# Patient Record
Sex: MALE | Race: WHITE | NOT HISPANIC OR LATINO | Employment: OTHER | URBAN - METROPOLITAN AREA
[De-identification: names, ages, dates, MRNs, and addresses within clinical notes are randomized per-mention and may not be internally consistent; named-entity substitution may affect disease eponyms.]

---

## 2020-11-12 ENCOUNTER — CONVERSION ENCOUNTER (OUTPATIENT)
Dept: CARDIOLOGY | Facility: CLINIC | Age: 75
End: 2020-11-12

## 2020-11-12 ENCOUNTER — OFFICE VISIT CONVERTED (OUTPATIENT)
Dept: CARDIOLOGY | Facility: CLINIC | Age: 75
End: 2020-11-12
Attending: INTERNAL MEDICINE

## 2021-05-10 NOTE — H&P
History and Physical      Patient Name: Dedrick Cho   Patient ID: [id number]   Sex: Male   YOB: 1945    Primary Care Provider: Mau Stark MD    Visit Date: November 12, 2020    Provider: Arnel Myers MD   Location: [facility]   Location Address: [address] [place] [address], [facility]   [place], [place]  [id number]   Location Phone: [phone]          Chief Complaint     Bradycardia with pacemaker.       History Of Present Illness  Consult requested by: Mau Stark MD   Dedrick Cho is a 75 year old /White male with a history of previous high-degree AV block and dual-chamber pacemaker who is following up today to establish a new cardiologist in the area. The patient symptom-wise has been doing well. He denies any chest pain, or shortness of breath. He does have occasional heart palpitations, but no other complaints.   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   FAMILY MEDICAL HISTORY: Nonsignificant for premature CAD.   PSYCHOSOCIAL HISTORY: Patient does not smoke or use alcohol. Drinks caffeine daily. .   CURRENT MEDICATIONS: Levothyroxine 175 mcg daily; clopidogrel 75 mg daily; atorvastatin 20 mg daily; Co-Q10 200 mg daily; carvedilol 6.25 mg b.i.d.; losartan 50 mg daily; fish oil; multivitamin daily.   ALLERGIES: No known drug allergies.       Review of Systems  · Constitutional  o Admits  o : fatigue, recent weight changes   o Denies  o : good general health lately  · Eyes  o Denies  o : blurred vision  · HENT  o Admits  o : hearing loss or ringing, chronic sinus problem  o Denies  o : swollen glands in neck  · Cardiovascular  o Admits  o : palpitations (fast, fluttering, or skipping beats)  o Denies  o : chest pain, swelling (feet, ankles, hands), shortness of breath while walking or lying flat  · Respiratory  o Denies  o : asthma or wheezing,  "COPD  · Gastrointestinal  o Denies  o : ulcers, nausea or vomiting  · Neurologic  o Admits  o : stroke  o Denies  o : lightheaded or dizzy, headaches  · Musculoskeletal  o Admits  o : joint pain  o Denies  o : back pain  · Endocrine  o Admits  o : thyroid disease, heat or cold intolerance, excessive thirst or urination  o Denies  o : diabetes  · Heme-Lymph  o Denies  o : bleeding or bruising tendency, anemia      Vitals  Date Time BP Position Site L\R Cuff Size HR RR TEMP (F) WT  HT  BMI kg/m2 BSA m2 O2 Sat FR L/min FiO2 HC       11/12/2020 01:42 /96 Sitting    66 - R   164lbs 0oz 5'  8\" 24.94 1.89       11/12/2020 01:42 /94 Sitting                       Physical Examination  · Constitutional  o Appearance  o : Awake, alert, in no acute distress.   · Head and Face  o HEENT  o : PERRLA.  · Eyes  o Conjunctivae  o : Normal.  · Ears, Nose, Mouth and Throat  o Oral Cavity  o :   § Oral Mucosa  § : Normal.  · Neck  o Inspection/Palpation  o : No JVD.  · Respiratory  o Respiratory  o : Chest is symmetrical. Lung fields are clear. No rhonchi or wheezes heard.  · Cardiovascular  o Heart  o :   § Auscultation of Heart  § : S1, S2 normal. Regular rate and rhythm without murmurs, gallops, or rubs.  o Peripheral Vascular System  o :   § Extremities  § : Nails normal. No clubbing or cyanosis. Femoral pulses adequate. Pedal pulses adequate. No peripheral edema.  · Gastrointestinal  o Abdominal Examination  o : Abdomen soft. No masses. No guarding or rigidity. No hepatosplenomegaly. Bowel sounds normal.  · Musculoskeletal  o General  o :   § General Musculoskeletal  § : Muscle tone and strength were normal.  · Skin and Subcutaneous Tissue  o General Inspection  o : Unremarkable.  · EKG  o EKG  o : Performed in the office today.  o Indications  o : Pacemaker.  o Results  o : AV-paced beats.  · Device Interrogation  o Device Interrogation  o : Patient had an interrogation of his dual-chamber pacemaker. Right atrial lead " paced 60% of the time and working normally. Right ventricular lead paced 100% of the time and working normally. R wave could not be tested due to pacemaker dependence. Patient had 11 nonsustained short bursts, which appear to be sinus tachycardia.           Assessment     ASSESSMENT & PLAN:    1.  High-degree AV block with dual-chamber pacemaker working properly.  Repeat interrogation in 6 months.  2.  Hypertension, mild elevation today.  Recommended keeping a home blood pressure log.  Continue with        current medications.  Counseled on low-sodium diet.  3.  Dyslipidemia, on statin.        Arnel Myers MD  JH:vm             Electronically Signed by: Jessica Moyer-, Other -Author on November 18, 2020 03:51:12 PM  Electronically Co-signed by: Arnel Myers MD -Reviewer on November 18, 2020 04:05:12 PM

## 2021-05-13 ENCOUNTER — OFFICE VISIT CONVERTED (OUTPATIENT)
Dept: CARDIOLOGY | Facility: CLINIC | Age: 76
End: 2021-05-13
Attending: INTERNAL MEDICINE

## 2021-05-14 VITALS
HEIGHT: 68 IN | BODY MASS INDEX: 24.86 KG/M2 | HEART RATE: 66 BPM | SYSTOLIC BLOOD PRESSURE: 156 MMHG | DIASTOLIC BLOOD PRESSURE: 96 MMHG | WEIGHT: 164 LBS

## 2021-06-06 NOTE — PROGRESS NOTES
"   Progress Note      Patient Name: Dedrick Cho   Patient ID: [id number]   Sex: Male   YOB: 1945    Primary Care Provider: Mau Stark MD    Visit Date: May 13, 2021    Provider: Arnel Myers MD   Location: [facility]   Location Address: [address] [place] [address], [facility]   [place], [place]  [id number]   Location Phone: [phone]          Chief Complaint     Bradycardia.       History Of Present Illness  REFERRING CARE PROVIDER: Mau Stark MD   Dedrick Cho is a 75 year old /White male with a history of high-degree AV block and dual-chamber pacemaker, hypertension, TIA, and previous nonsustained ventricular tachycardia episodes who has been doing well. No chest pain, shortness of breath, or tachycardia. His home blood pressures have been running in the 130s/80s.   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   PSYCHOSOCIAL HISTORY: Previous tobacco use, but quit. Denies alcohol use.   CURRENT MEDICATIONS: Medications reviewed and are as documented.      ALLERGIES:  No known drug allergies.       Review of Systems  · Cardiovascular  o Denies  o : palpitations (fast, fluttering, or skipping beats), swelling (feet, ankles, hands), shortness of breath while walking or lying flat, chest pain or angina pectoris   · Respiratory  o Denies  o : chronic or frequent cough      Vitals  Date Time BP Position Site L\R Cuff Size HR RR TEMP (F) WT  HT  BMI kg/m2 BSA m2 O2 Sat FR L/min FiO2 HC       05/13/2021 09:31 /92 Sitting    82 - R   162lbs 0oz 5'  8\" 24.63 1.88             Physical Examination  · Constitutional  o Appearance  o : Awake, alert, in no acute distress.   · Eyes  o Conjunctivae  o : Normal.  · Ears, Nose, Mouth and Throat  o Oral Cavity  o :   § Oral Mucosa  § : Normal.  · Neck  o Inspection/Palpation  o : No JVD. Good carotid upstroke. No " thyromegaly.  · Respiratory  o Respiratory  o : Good respiratory effort. Clear to percussion and auscultation.  · Cardiovascular  o Heart  o :   § Auscultation of Heart  § : S1, S2 normal. Regular rate and rhythm without murmurs, gallops, or rubs.  o Peripheral Vascular System  o :   § Extremities  § : Good femoral and pedal pulses. No pedal edema.  · Gastrointestinal  o Abdominal Examination  o : Soft. No tenderness or masses felt. No hepatosplenomegaly. Abdominal aorta is not palpable.  · Device Interrogation  o Device Interrogation  o : His dual-chamber pacemaker was interrogated. Right atrial lead paced 63% of the time, working normally. Right ventricular lead paced 96% of the time, working normally. R-wave paced at 30.           Assessment     ASSESSMENT & PLAN:    1.  High-degree AV block status post pacemaker working properly.  Repeat interrogation in 6 months.  2.  Hypertension, elevated today but controlled at home.  Continue with monitoring.  Goal of less than 140/90.         Continue on losartan 50 mg daily.             Electronically Signed by: Jessica Moyer-, Other -Author on May 15, 2021 09:56:14 AM  Electronically Co-signed by: Arenl Myers MD -Reviewer on May 17, 2021 03:29:46 PM

## 2021-07-15 VITALS
HEART RATE: 82 BPM | WEIGHT: 162 LBS | HEIGHT: 68 IN | DIASTOLIC BLOOD PRESSURE: 92 MMHG | SYSTOLIC BLOOD PRESSURE: 157 MMHG | BODY MASS INDEX: 24.55 KG/M2

## 2021-11-18 ENCOUNTER — OFFICE VISIT (OUTPATIENT)
Dept: CARDIOLOGY | Facility: CLINIC | Age: 76
End: 2021-11-18

## 2021-11-18 ENCOUNTER — CLINICAL SUPPORT NO REQUIREMENTS (OUTPATIENT)
Dept: CARDIOLOGY | Facility: CLINIC | Age: 76
End: 2021-11-18

## 2021-11-18 VITALS
SYSTOLIC BLOOD PRESSURE: 121 MMHG | DIASTOLIC BLOOD PRESSURE: 77 MMHG | WEIGHT: 156 LBS | HEIGHT: 70 IN | BODY MASS INDEX: 22.33 KG/M2 | HEART RATE: 65 BPM

## 2021-11-18 DIAGNOSIS — Z95.0 PRESENCE OF CARDIAC PACEMAKER: Primary | ICD-10-CM

## 2021-11-18 DIAGNOSIS — I10 ESSENTIAL HYPERTENSION: ICD-10-CM

## 2021-11-18 PROBLEM — Z86.73 HISTORY OF TIA (TRANSIENT ISCHEMIC ATTACK): Status: ACTIVE | Noted: 2021-11-18

## 2021-11-18 PROCEDURE — 93280 PM DEVICE PROGR EVAL DUAL: CPT | Performed by: INTERNAL MEDICINE

## 2021-11-18 PROCEDURE — 99214 OFFICE O/P EST MOD 30 MIN: CPT | Performed by: NURSE PRACTITIONER

## 2021-11-18 RX ORDER — CETIRIZINE HYDROCHLORIDE 10 MG/1
TABLET ORAL
COMMUNITY
Start: 2021-08-16

## 2021-11-18 RX ORDER — MULTIPLE VITAMINS W/ MINERALS TAB 9MG-400MCG
TAB ORAL DAILY
COMMUNITY

## 2021-11-18 RX ORDER — CARVEDILOL 6.25 MG/1
6.25 TABLET ORAL 2 TIMES DAILY
COMMUNITY
Start: 2021-11-12

## 2021-11-18 RX ORDER — LEVOTHYROXINE SODIUM 0.2 MG/1
200 TABLET ORAL DAILY
COMMUNITY
Start: 2021-05-11 | End: 2022-12-05

## 2021-11-18 RX ORDER — ATORVASTATIN CALCIUM 20 MG/1
1 TABLET, FILM COATED ORAL DAILY
COMMUNITY
Start: 2021-10-22

## 2021-11-18 RX ORDER — CLOPIDOGREL BISULFATE 75 MG/1
75 TABLET ORAL DAILY
COMMUNITY
Start: 2021-09-19

## 2021-11-18 RX ORDER — LOSARTAN POTASSIUM 50 MG/1
1 TABLET ORAL DAILY
COMMUNITY
Start: 2021-10-15

## 2021-11-18 RX ORDER — UBIDECARENONE 60 MG
CAPSULE ORAL
COMMUNITY

## 2021-11-18 NOTE — PROGRESS NOTES
Normal Dual Chamber Pacemaker device interrogation.  Normal evaluation of device function and lead measurements.  No optimization was needed of parameters or maximization of device longevity.  Patient is on Manual downloads and is pacemaker dependent.

## 2021-11-18 NOTE — PATIENT INSTRUCTIONS
"Low-Sodium Eating Plan  Sodium, which is an element that makes up salt, helps you maintain a healthy balance of fluids in your body. Too much sodium can increase your blood pressure and cause fluid and waste to be held in your body.  Your health care provider or dietitian may recommend following this plan if you have high blood pressure (hypertension), kidney disease, liver disease, or heart failure. Eating less sodium can help lower your blood pressure, reduce swelling, and protect your heart, liver, and kidneys.  What are tips for following this plan?  Reading food labels  · The Nutrition Facts label lists the amount of sodium in one serving of the food. If you eat more than one serving, you must multiply the listed amount of sodium by the number of servings.  · Choose foods with less than 140 mg of sodium per serving.  · Avoid foods with 300 mg of sodium or more per serving.  Shopping    · Look for lower-sodium products, often labeled as \"low-sodium\" or \"no salt added.\"  · Always check the sodium content, even if foods are labeled as \"unsalted\" or \"no salt added.\"  · Buy fresh foods.  ? Avoid canned foods and pre-made or frozen meals.  ? Avoid canned, cured, or processed meats.  · Buy breads that have less than 80 mg of sodium per slice.    Cooking    · Eat more home-cooked food and less restaurant, buffet, and fast food.  · Avoid adding salt when cooking. Use salt-free seasonings or herbs instead of table salt or sea salt. Check with your health care provider or pharmacist before using salt substitutes.  · Cook with plant-based oils, such as canola, sunflower, or olive oil.    Meal planning  · When eating at a restaurant, ask that your food be prepared with less salt or no salt, if possible. Avoid dishes labeled as brined, pickled, cured, smoked, or made with soy sauce, miso, or teriyaki sauce.  · Avoid foods that contain MSG (monosodium glutamate). MSG is sometimes added to Chinese food, bouillon, and some " "canned foods.  · Make meals that can be grilled, baked, poached, roasted, or steamed. These are generally made with less sodium.  General information  Most people on this plan should limit their sodium intake to 1,500-2,000 mg (milligrams) of sodium each day.  What foods should I eat?  Fruits  Fresh, frozen, or canned fruit. Fruit juice.  Vegetables  Fresh or frozen vegetables. \"No salt added\" canned vegetables. \"No salt added\" tomato sauce and paste. Low-sodium or reduced-sodium tomato and vegetable juice.  Grains  Low-sodium cereals, including oats, puffed wheat and rice, and shredded wheat. Low-sodium crackers. Unsalted rice. Unsalted pasta. Low-sodium bread. Whole-grain breads and whole-grain pasta.  Meats and other proteins  Fresh or frozen (no salt added) meat, poultry, seafood, and fish. Low-sodium canned tuna and salmon. Unsalted nuts. Dried peas, beans, and lentils without added salt. Unsalted canned beans. Eggs. Unsalted nut butters.  Dairy  Milk. Soy milk. Cheese that is naturally low in sodium, such as ricotta cheese, fresh mozzarella, or Swiss cheese. Low-sodium or reduced-sodium cheese. Cream cheese. Yogurt.  Seasonings and condiments  Fresh and dried herbs and spices. Salt-free seasonings. Low-sodium mustard and ketchup. Sodium-free salad dressing. Sodium-free light mayonnaise. Fresh or refrigerated horseradish. Lemon juice. Vinegar.  Other foods  Homemade, reduced-sodium, or low-sodium soups. Unsalted popcorn and pretzels. Low-salt or salt-free chips.  The items listed above may not be a complete list of foods and beverages you can eat. Contact a dietitian for more information.  What foods should I avoid?  Vegetables  Sauerkraut, pickled vegetables, and relishes. Olives. French fries. Onion rings. Regular canned vegetables (not low-sodium or reduced-sodium). Regular canned tomato sauce and paste (not low-sodium or reduced-sodium). Regular tomato and vegetable juice (not low-sodium or reduced-sodium). " Frozen vegetables in sauces.  Grains  Instant hot cereals. Bread stuffing, pancake, and biscuit mixes. Croutons. Seasoned rice or pasta mixes. Noodle soup cups. Boxed or frozen macaroni and cheese. Regular salted crackers. Self-rising flour.  Meats and other proteins  Meat or fish that is salted, canned, smoked, spiced, or pickled. Precooked or cured meat, such as sausages or meat loaves. Wilkins. Ham. Pepperoni. Hot dogs. Corned beef. Chipped beef. Salt pork. Jerky. Pickled herring. Anchovies and sardines. Regular canned tuna. Salted nuts.  Dairy  Processed cheese and cheese spreads. Hard cheeses. Cheese curds. Blue cheese. Feta cheese. String cheese. Regular cottage cheese. Buttermilk. Canned milk.  Fats and oils  Salted butter. Regular margarine. Ghee. Wilkins fat.  Seasonings and condiments  Onion salt, garlic salt, seasoned salt, table salt, and sea salt. Canned and packaged gravies. Worcestershire sauce. Tartar sauce. Barbecue sauce. Teriyaki sauce. Soy sauce, including reduced-sodium. Steak sauce. Fish sauce. Oyster sauce. Cocktail sauce. Horseradish that you find on the shelf. Regular ketchup and mustard. Meat flavorings and tenderizers. Bouillon cubes. Hot sauce. Pre-made or packaged marinades. Pre-made or packaged taco seasonings. Relishes. Regular salad dressings. Salsa.  Other foods  Salted popcorn and pretzels. Corn chips and puffs. Potato and tortilla chips. Canned or dried soups. Pizza. Frozen entrees and pot pies.  The items listed above may not be a complete list of foods and beverages you should avoid. Contact a dietitian for more information.  Summary  · Eating less sodium can help lower your blood pressure, reduce swelling, and protect your heart, liver, and kidneys.  · Most people on this plan should limit their sodium intake to 1,500-2,000 mg (milligrams) of sodium each day.  · Canned, boxed, and frozen foods are high in sodium. Restaurant foods, fast foods, and pizza are also very high in sodium.  You also get sodium by adding salt to food.  · Try to cook at home, eat more fresh fruits and vegetables, and eat less fast food and canned, processed, or prepared foods.  This information is not intended to replace advice given to you by your health care provider. Make sure you discuss any questions you have with your health care provider.  Document Revised: 01/22/2021 Document Reviewed: 11/18/2020  [employer] Patient Education © 2021 [employer]

## 2021-12-02 ENCOUNTER — TELEPHONE (OUTPATIENT)
Dept: CARDIOLOGY | Facility: CLINIC | Age: 76
End: 2021-12-02

## 2022-06-01 ENCOUNTER — OFFICE VISIT (OUTPATIENT)
Dept: CARDIOLOGY | Facility: CLINIC | Age: 77
End: 2022-06-01

## 2022-06-01 VITALS
HEART RATE: 66 BPM | WEIGHT: 154 LBS | BODY MASS INDEX: 22.05 KG/M2 | HEIGHT: 70 IN | SYSTOLIC BLOOD PRESSURE: 138 MMHG | DIASTOLIC BLOOD PRESSURE: 80 MMHG

## 2022-06-01 DIAGNOSIS — Z95.0 PRESENCE OF CARDIAC PACEMAKER: Primary | ICD-10-CM

## 2022-06-01 DIAGNOSIS — I10 ESSENTIAL HYPERTENSION: ICD-10-CM

## 2022-06-01 PROCEDURE — 99214 OFFICE O/P EST MOD 30 MIN: CPT | Performed by: INTERNAL MEDICINE

## 2022-06-01 NOTE — PROGRESS NOTES
"Chief Complaint  CHB with dual chamber pacemaker    Subjective    Patient is doing well symptomatically denies any problems with chest pain shortness of breath or tachycardia    Past Medical History:   Diagnosis Date   • CHB with dual chamber pacemaker 2021   • Essential hypertension 2021   • History of TIA (transient ischemic attack) 2021         Current Outpatient Medications:   •  Aspirin Buf,CaCarb-MgCarb-MgO, 81 MG tablet, Take 81 mg by mouth Daily., Disp: , Rfl:   •  atorvastatin (LIPITOR) 20 MG tablet, Take 1 tablet by mouth Daily., Disp: , Rfl:   •  B Complex-C-Biotin-D-Zinc-FA (VITAL-D RX PO), Take  by mouth., Disp: , Rfl:   •  carvedilol (COREG) 6.25 MG tablet, 6.25 mg 2 (Two) Times a Day., Disp: , Rfl:   •  cetirizine (zyrTEC) 10 MG tablet, , Disp: , Rfl:   •  clopidogrel (PLAVIX) 75 MG tablet, 75 mg Daily., Disp: , Rfl:   •  Coenzyme Q10 60 MG capsule, Take  by mouth., Disp: , Rfl:   •  Ferrous Gluconate (IRON 27 PO), Take  by mouth., Disp: , Rfl:   •  losartan (COZAAR) 50 MG tablet, Take 1 tablet by mouth Daily., Disp: , Rfl:   •  multivitamin with minerals tablet tablet, Take  by mouth Daily., Disp: , Rfl:   •  levothyroxine (SYNTHROID, LEVOTHROID) 200 MCG tablet, Take 200 mcg by mouth Daily., Disp: , Rfl:     There are no discontinued medications.  No Known Allergies     Social History     Tobacco Use   • Smoking status: Former Smoker     Types: Cigarettes     Quit date:      Years since quittin.4   • Smokeless tobacco: Never Used   Vaping Use   • Vaping Use: Never used   Substance Use Topics   • Alcohol use: Not Currently   • Drug use: Never       Family History   Problem Relation Age of Onset   • Cancer Father         Objective     /80   Pulse 66   Ht 177.8 cm (70\")   Wt 69.9 kg (154 lb)   BMI 22.10 kg/m²       Physical Exam    General Appearance:   · no acute distress  · Alert and oriented x3  HENT:   · lips not cyanotic  · Atraumatic  Neck:  · No jvd "   · supple  Respiratory:  · no respiratory distress  · normal breath sounds  · no rales  Cardiovascular:  · Regular rate and rhythm  · no S3, no S4   · no murmur  · no rub  Extremities  · No cyanosis  · lower extremity edema: none    Skin:   · warm, dry  · No rashes      Result Review :     No results found for: PROBNP     Component   Ref Range & Units 3 wk ago   CHOLESTEROL 2-TL   <200 mg/dL 131    Comment:    NATIONAL CHOLESTEROL GUIDELINES   [facility] ([facility]) GUIDELINES FOR CLASSIFICATION, TESTING AND MANAGEMENT OF CHOLESTEROL LEVELS IN ADULTS OVER 20 YEARS OF AGE. THIS NEW CLASSIFICATION CREATES THREE CATEGORIES OF RISK FOR CORONARY HEART DISEASE, REGARDLESS   OF AGE OR SEX, ACCORDING TO TOTAL LDL CHOLESTEROLS LEVELS.     BASED ON TOTAL CHOLESTEROL LEVEL   DESIRABLE      <200 MG/DL   BORDERLINE-HIGH 200-239 MG/DL   HIGH            >=240 MG/DL   TRIGLYCERIDES 2-TL   30 - 200 mg/dL 128    Comment: DUE TO REVISED SPECIFICITY OF A TRIGLYCERIDE RESULT AT 600MG/DL OR GREATER MAY CAUSE A POSITIVE BIAS OF APPROXIMATELY 2.1 MMOL/L TO CHLORIDE WHICH CAN RESULT IN AN ERRONEOUSLY LOW ANION GAP.   HDL-TL   >60 mg/dL 27 Low     Comment:    <40 MG/DL= MAJOR RISK FACTOR FOR CHD   60 MG/DL OR GREATER= NEG RISK FACTOR FOR CHD   LDL Cholesterol    0 - 99 mg/dL 79    RISK FACTOR-TL  4.9    Comment:    CHD RISK            CHOL/HDL RATIO                       MALE         FEMALE   1.0::AVERAGE        5.0          4.4   2.0::AVERAGE        9.6          7.1   1.0::AVERAGE        5.0          4.4   Resulting Agency [facility]     WBC (WHITE BLOOD CELL)-TL   4.8 - 10.8 10*3/uL 7.9    RBC (RED BLOOD CELL-TL   4.70 - 6.10 10*6/uL 4.37 Low     HEMOGLOBIN-TL   14.0 - 18.0 g/dL 13.4 Low     HEMATOCRIT-TL   42 - 52 % 41.6 Low     MCV-TL   80 - 94 fL 95.2 High     MCH-TL   27 - 31 pg 30.7    MCHC-TL   32 - 36 g/dL 32.2    RDW-SD-TL   37 - 54 fL 44.9    RDW-SD-TL   11.5 - 15.5 % 12.9    Platelet  Count-TL   130 - 400 10*3/uL 232    MPV-TL   9.2 - 12.6 fL 10.8    BASIC METABOLIC PANEL  Order: [id number]  Component   Ref Range & Units 1 d ago   GLUCOSE-TL   70 - 110 mg/dL 106    BUN-TL   7 - 18 mg/dL 16    CREATININE-TL   0.8 - 1.3 mg/dL 1.1    SODIUM-TL   136 - 145 mmol/L 141    POTASSIUM-TL   3.5 - 5.1 mmol/L 4.4    Chloride   98 - 107 mmol/L 110 High     CARBON DIOXIDE-TL   21 - 32 mmol/L 24    CALCIUM-TL   8.7 - 10.2 mg/dL 8.6 Low     ANION GAP-TL   4 - 12 mmol/L 12    GFR ESTIMATE-TL   mL/min 69             No results found for: TSH   No results found for: FREET4   No results found for: DDIMERQUANT  No results found for: MG   No results found for: DIGOXIN   No results found for: TROPONINT          No results found for: POCTROP                   Diagnoses and all orders for this visit:    1. CHB with dual chamber pacemaker (Primary)  Assessment & Plan:  Device working normally repeat in office interrogation in 6 months      2. Essential hypertension  Assessment & Plan:  Blood pressure at goal range continue losartan 50 daily            Follow Up     Return in about 6 months (around 12/1/2022) for Follow with Rohit Long w/f/u.          Patient was given instructions and counseling regarding his condition or for health maintenance advice. Please see specific information pulled into the AVS if appropriate.

## 2022-12-05 ENCOUNTER — OFFICE VISIT (OUTPATIENT)
Dept: CARDIOLOGY | Facility: CLINIC | Age: 77
End: 2022-12-05

## 2022-12-05 VITALS
SYSTOLIC BLOOD PRESSURE: 159 MMHG | HEIGHT: 70 IN | WEIGHT: 156 LBS | BODY MASS INDEX: 22.33 KG/M2 | DIASTOLIC BLOOD PRESSURE: 83 MMHG | HEART RATE: 78 BPM

## 2022-12-05 DIAGNOSIS — Z95.0 PRESENCE OF CARDIAC PACEMAKER: Primary | ICD-10-CM

## 2022-12-05 DIAGNOSIS — I10 ESSENTIAL HYPERTENSION: ICD-10-CM

## 2022-12-05 PROBLEM — N40.0 BENIGN PROSTATIC HYPERPLASIA: Status: ACTIVE | Noted: 2022-10-14

## 2022-12-05 PROCEDURE — 99214 OFFICE O/P EST MOD 30 MIN: CPT | Performed by: NURSE PRACTITIONER

## 2022-12-05 RX ORDER — LEVOTHYROXINE SODIUM 0.15 MG/1
150 TABLET ORAL DAILY
COMMUNITY
Start: 2022-12-02

## 2022-12-05 NOTE — PROGRESS NOTES
Chief Complaint  Follow-up and Hypertension    Subjective            History of Present Illness  Dedrick Cho is a 77-year-old white/ male patient who presents to the office today for follow-up.  He has a pacemaker implanted for complete heart block and hypertension.  He reports compliance with all of his medications.  He reports occasional chest discomfort at nighttime when he lays down, denies exertional chest discomfort.  He denies any shortness of breath, lightheadedness/dizziness, palpitations, or edema.    PMH  Past Medical History:   Diagnosis Date   • CHB with dual chamber pacemaker 2021   • Essential hypertension 2021   • History of TIA (transient ischemic attack) 2021         ALLERGY  No Known Allergies       SURGICALHX  Past Surgical History:   Procedure Laterality Date   • INSERT / REPLACE / REMOVE PACEMAKER            SOC  Social History     Socioeconomic History   • Marital status:    Tobacco Use   • Smoking status: Former     Types: Cigarettes     Quit date:      Years since quittin.9   • Smokeless tobacco: Never   Vaping Use   • Vaping Use: Never used   Substance and Sexual Activity   • Alcohol use: Not Currently   • Drug use: Never   • Sexual activity: Defer         FAMHX  Family History   Problem Relation Age of Onset   • Cancer Father           MEDSIGONLY  Current Outpatient Medications on File Prior to Visit   Medication Sig   • Aspirin Buf,CaCarb-MgCarb-MgO, 81 MG tablet Take 81 mg by mouth Daily.   • atorvastatin (LIPITOR) 20 MG tablet Take 1 tablet by mouth Daily.   • B Complex-C-Biotin-D-Zinc-FA (VITAL-D RX PO) Take  by mouth.   • carvedilol (COREG) 6.25 MG tablet 6.25 mg 2 (Two) Times a Day.   • cetirizine (zyrTEC) 10 MG tablet    • clopidogrel (PLAVIX) 75 MG tablet 75 mg Daily.   • Coenzyme Q10 60 MG capsule Take  by mouth.   • Ferrous Gluconate (IRON 27 PO) Take  by mouth.   • levothyroxine (SYNTHROID, LEVOTHROID) 150 MCG tablet Take 150 mcg by  "mouth Daily.   • losartan (COZAAR) 50 MG tablet Take 1 tablet by mouth Daily.   • multivitamin with minerals tablet tablet Take  by mouth Daily.   • levothyroxine (SYNTHROID, LEVOTHROID) 200 MCG tablet Take 200 mcg by mouth Daily.     No current facility-administered medications on file prior to visit.         Objective   /83   Pulse 78   Ht 177.8 cm (70\")   Wt 70.8 kg (156 lb)   BMI 22.38 kg/m²       Physical Exam  Constitutional:       Appearance: He is normal weight.   HENT:      Head: Normocephalic.   Neck:      Vascular: No carotid bruit.   Cardiovascular:      Rate and Rhythm: Normal rate and regular rhythm.      Pulses: Normal pulses.      Heart sounds: Normal heart sounds. No murmur heard.  Pulmonary:      Effort: Pulmonary effort is normal.      Breath sounds: Normal breath sounds.   Musculoskeletal:      Cervical back: Neck supple.      Right lower leg: No edema.      Left lower leg: No edema.   Skin:     General: Skin is dry.      Capillary Refill: Capillary refill takes less than 2 seconds.   Neurological:      Mental Status: He is alert and oriented to person, place, and time.   Psychiatric:         Behavior: Behavior normal.       Result Review :   The following data was reviewed by: DIANE Solorio on 12/05/2022:  No results found for: PROBNP       No results found for: TSH   No results found for: FREET4   No results found for: DDIMERQUANT  No results found for: MG   No results found for: DIGOXIN   No results found for: TROPONINT               Assessment and Plan    Diagnoses and all orders for this visit:    1. CHB with dual chamber pacemaker (Primary)  Last home remote session was 11/16/2022 with no acute episodes and 1-1/2 years left on the battery.  Interrogate device at next visit.    2. Essential hypertension  His blood pressure is elevated in the office today, he reports \"140/80's\" at home. Check blood pressure twice a day for the next two weeks, blood pressure log provided for " patient.  Will review log once available to me and will make any necessary medication adjustments needed at that time.  For now continue carvedilol 6.25 mg twice daily and losartan 50 mg daily.  Low-sodium diet discussed.    Cold all the time, recommended iron panel with PCP      Follow Up   Return in about 6 months (around 6/5/2023) for Follow up with Dr Myers with Device Check.    Patient was given instructions and counseling regarding his condition or for health maintenance advice. Please see specific information pulled into the AVS if appropriate.     Dedrick Cho  reports that he quit smoking about 54 years ago. His smoking use included cigarettes. He has never used smokeless tobacco.         Janessa Jolley, APRN  12/05/22  09:53 EST    Dictated Utilizing Dragon Dictation

## 2023-09-26 ENCOUNTER — TELEPHONE (OUTPATIENT)
Dept: CARDIOLOGY | Facility: CLINIC | Age: 78
End: 2023-09-26
Payer: MEDICARE

## 2023-09-26 NOTE — TELEPHONE ENCOUNTER
I called patient to give him results of his pacemaker manual download.  I requested for him to continue to send in monthly downloads due to monitoring battery status.

## 2023-12-13 ENCOUNTER — TELEPHONE (OUTPATIENT)
Dept: CARDIOLOGY | Facility: CLINIC | Age: 78
End: 2023-12-13
Payer: MEDICARE

## 2023-12-13 NOTE — TELEPHONE ENCOUNTER
I notified patient of battery status of his pacemaker.  I have him doing monthly remotes because he is approaching [facility].

## 2024-01-19 ENCOUNTER — TELEPHONE (OUTPATIENT)
Dept: CARDIOLOGY | Facility: CLINIC | Age: 79
End: 2024-01-19
Payer: COMMERCIAL

## 2024-05-01 ENCOUNTER — TELEPHONE (OUTPATIENT)
Dept: CARDIOLOGY | Facility: CLINIC | Age: 79
End: 2024-05-01
Payer: MEDICARE

## 2024-05-01 NOTE — TELEPHONE ENCOUNTER
Patient has a MDT Dual chamber pacemaker that has reached EMIL.    I have spoke to Mr. Cho and he is aware that [facility] will contact him with all the details for the day it will be scheduled.

## 2024-05-02 ENCOUNTER — PREP FOR SURGERY (OUTPATIENT)
Dept: OTHER | Facility: HOSPITAL | Age: 79
End: 2024-05-02
Payer: MEDICARE

## 2024-05-02 DIAGNOSIS — Z45.010 PACEMAKER AT END OF BATTERY LIFE: Primary | ICD-10-CM

## 2024-05-02 RX ORDER — SODIUM CHLORIDE 9 MG/ML
40 INJECTION, SOLUTION INTRAVENOUS AS NEEDED
OUTPATIENT
Start: 2024-05-02

## 2024-05-02 RX ORDER — SODIUM CHLORIDE 0.9 % (FLUSH) 0.9 %
10 SYRINGE (ML) INJECTION EVERY 12 HOURS SCHEDULED
OUTPATIENT
Start: 2024-05-02

## 2024-05-02 RX ORDER — SODIUM CHLORIDE 0.9 % (FLUSH) 0.9 %
10 SYRINGE (ML) INJECTION AS NEEDED
OUTPATIENT
Start: 2024-05-02

## 2024-05-06 ENCOUNTER — TELEPHONE (OUTPATIENT)
Dept: CARDIOLOGY | Facility: CLINIC | Age: 79
End: 2024-05-06
Payer: MEDICARE

## 2024-06-11 ENCOUNTER — TELEPHONE (OUTPATIENT)
Dept: CARDIOLOGY | Facility: CLINIC | Age: 79
End: 2024-06-11
Payer: MEDICARE

## 2024-06-11 NOTE — TELEPHONE ENCOUNTER
Caller: Dedrick Cho    Relationship: Self    Best call back number: [phone]  CAN LEAVE MESSAGE    What is the best time to reach you: ANY    Who are you requesting to speak with (clinical staff, provider,  specific staff member): ANY    Do you know the name of the person who called:     What was the call regarding: PATIENT IS NEEDING INSTRUCTIONS AND CLARIFICATION FOR HIS PROCEDURE ON 6-18-24. PLEASE REACH OUT AND ADVISE.      PATIENT NEEDS TO KNOW WHAT TIME TO BE THERE.  IS THERE ANYTHING HE SHOULD OR SHOULD NOT DO BEFORE THE PROCEDURE?

## 2024-06-12 NOTE — TELEPHONE ENCOUNTER
I spoke to patient and gave an arrival time of 6:30 on 06/18/24 for [facility]. Patient was instructed to have a  for the day of the procedure and to arrive at the [facility] registration area. Patient was educated about stent placement and informed that in the event of stent placement, the patient will be required to stay overnight for observation. Patient was instructed to continue all medications as usual. Patient was instructed to be NPO after midnight with sips of water as needed. Patient was instructed to have labs completed on the morning of the procedure. Patient is agreeable with no other questions or concerns.

## 2024-06-14 RX ORDER — FERROUS SULFATE 324(65)MG
324 TABLET, DELAYED RELEASE (ENTERIC COATED) ORAL
COMMUNITY

## 2024-06-14 RX ORDER — LANOLIN ALCOHOL/MO/W.PET/CERES
800 CREAM (GRAM) TOPICAL DAILY
COMMUNITY

## 2024-06-14 RX ORDER — MULTIVIT WITH MINERALS/LUTEIN
250 TABLET ORAL DAILY
COMMUNITY

## 2024-06-14 NOTE — PRE-PROCEDURE INSTRUCTIONS
PATIENT INSTRUCTED TO BE:    - NPO AFTER MIDNIGHT EXCEPT CAN HAVE SIPS OF WATER WITH HIS MEDICATION PRIOR TO PROCEDURE    -  INSTRUCTED NO LOTIONS, JEWELRY, PIERCINGS, OR DEODORANT DAY OF THE PROCEDURE    - INSTRUCTED TO TAKE THE FOLLOWING MEDICATIONS THE DAY OF SURGERY:       CoQ10, Losartan, Corag, Plavix, Folic Acid, Levothyroxine, Vit. C    - INSTRUCTED PT TO FOLLOW ANY INSTRUCTIONS GIVEN BY HIS CARDIOLOGIST.        - INFORMED THE PATIENT HE CAN HAVE TWO VISITORS WITH HIM THE DAY OF THE PROCEDURE    - INSTRUCTED PT TO PARK ON THE NORTH SIDE OF THE HOSPITAL IN THE OPEN PARKING LOT, ENTER THE HOSPITAL THRU ENTRANCE C/ [address] AND  CHECK IN AT THE REGISTRATION DESK, AFTER REGISTRATION PT WILL BE TAKEN THE THE PREOP AREA FOR HIS OR HER PROCEDURE.    -ARRIVAL TIME GIVEN BY CARDIOLOGIST OFFICE, INFORMED PT IF ARRIVAL TIME CHANGES OR ADJUSTMENTS NEED TO BE MADE IN THEIR ARRIVAL TIME, HE/SHE WOULD RECEIVE A CALL.    -INSTRUCTED PT TO COME TO [facility] TO GET THEIR LABS/ EKG DONE PRIOR TO PROCEDURE      - PATIENT VERBALIZED UNDERSTANDING

## 2024-06-17 NOTE — H&P
"Cardiology Consultation Note  [facility] CATH LAB          Patient Identification:  Dedrick Cho      [id number]  78 y.o.        male  1945         PCP: Shaila Martin MD      History of Present Illness:     Patient is a 78-year-old with a prior history of complete heart block and dual-chamber pacemaker, essential hypertension, dyslipidemia who has been noted to reach EMIL on his pacemaker.  He denies any chest pain or change in his breathing ability    Past History:  Past Medical History:   Diagnosis Date    CHB with dual chamber pacemaker 2021    follows with Dr. Myers    Disease of thyroid gland     Essential hypertension 2021    History of TIA (transient ischemic attack) 2021    Hyperlipidemia     Seasonal allergies      Past Surgical History:   Procedure Laterality Date    COLONOSCOPY      CYST REMOVAL      INSERT / REPLACE / REMOVE PACEMAKER       No Known Allergies  Social History     Socioeconomic History    Marital status:    Tobacco Use    Smoking status: Former     Current packs/day: 0.00     Types: Cigarettes     Quit date:      Years since quittin.4    Smokeless tobacco: Never   Vaping Use    Vaping status: Never Used   Substance and Sexual Activity    Alcohol use: Not Currently    Drug use: Never    Sexual activity: Defer     Family History   Problem Relation Age of Onset    Cancer Father      Medications:  No current facility-administered medications for this encounter.     Physical exam:    Ht 177.8 cm (70\")   Wt 65.8 kg (145 lb)   BMI 20.81 kg/m²  Body mass index is 20.81 kg/m².   Oxygen saturation   @FLOWAN(10::1)@ No data recorded    General Appearance:   no acute distress  HENT:   lips not cyanotic  Neck:  thyroid not enlarged  supple  Respiratory:  no respiratory distress  normal breath sounds  no rales  Cardiovascular:  no jugular venous distention  regular rhythm  apical impulse normal  S1 normal, S2 normal  no S3, no S4   no murmur  no " "rub, no thrill  no carotid bruit  pedal pulses normal  lower extremity edema: none    Gastrointestinal:   bowel sounds normal  non-tender  no hepatomegaly, no splenomegaly  Musculoskeletal:  no clubbing of fingers.   normocephalic, head atraumatic  Skin:   warm, dry  Neuro/Psychiatric:  judgement and insight appropriate  normal mood and affect    Cardiographics:       No results found for this or any previous visit.      Cardiolite (Tc-99m Sestamibi) stress test     Lab Review:           Invalid input(s): \"PLATELETCT\"                            CrCl cannot be calculated (Patient's most recent lab result is older than the maximum 30 days allowed.).         Invalid input(s): \"LDLCALC\"        No results found for: \"TSH\"   No results found for: \"HGBA1C\"   No results found for: \"DIGOXIN\"   No results found for: \"DDIMERQUAN\"         Assessment:      Pacemaker at end of battery life      Initial cardiac assessment: Patient with dual-chamber pacemaker chronically low right ventricular sensing other parameters though appear to be within normal functioning level discussed with him just a dual-chamber battery change with the possible risk of infection, bleeding need for possible placement new lead patient was agreeable      Recommendations:  1.  Dual-chamber permanent pacemaker battery change        Thank you for allowing us to share in [facility].        Arnel Myers MD  6/17/2024  10:05 EDT    "

## 2024-06-18 ENCOUNTER — APPOINTMENT (OUTPATIENT)
Dept: CARDIOLOGY | Facility: HOSPITAL | Age: 79
End: 2024-06-18
Payer: MEDICARE

## 2024-06-18 ENCOUNTER — HOSPITAL ENCOUNTER (OUTPATIENT)
Facility: HOSPITAL | Age: 79
Setting detail: HOSPITAL OUTPATIENT SURGERY
Discharge: HOME | End: 2024-06-18
Attending: INTERNAL MEDICINE | Admitting: INTERNAL MEDICINE
Payer: MEDICARE

## 2024-06-18 VITALS
BODY MASS INDEX: 21.71 KG/M2 | SYSTOLIC BLOOD PRESSURE: 117 MMHG | DIASTOLIC BLOOD PRESSURE: 72 MMHG | HEIGHT: 70 IN | TEMPERATURE: 97.7 F | WEIGHT: 151.68 LBS | OXYGEN SATURATION: 97 % | HEART RATE: 62 BPM | RESPIRATION RATE: 19 BRPM

## 2024-06-18 DIAGNOSIS — Z45.010 PACEMAKER AT END OF BATTERY LIFE: ICD-10-CM

## 2024-06-18 LAB
ANION GAP SERPL CALCULATED.3IONS-SCNC: 10.1 MMOL/L (ref 5–15)
BASOPHILS # BLD AUTO: 0.07 10*3/MM3 (ref 0–0.2)
BASOPHILS NFR BLD AUTO: 0.6 % (ref 0–1.5)
BH CV ECHO MEAS - EDV(CUBED): 59.3 ML
BH CV ECHO MEAS - EDV(MOD-SP2): 52.9 ML
BH CV ECHO MEAS - EDV(MOD-SP4): 90.9 ML
BH CV ECHO MEAS - EF(MOD-BP): 55.9 %
BH CV ECHO MEAS - EF(MOD-SP2): 55.4 %
BH CV ECHO MEAS - EF(MOD-SP4): 55.1 %
BH CV ECHO MEAS - ESV(CUBED): 22 ML
BH CV ECHO MEAS - ESV(MOD-SP2): 23.6 ML
BH CV ECHO MEAS - ESV(MOD-SP4): 40.8 ML
BH CV ECHO MEAS - FS: 28.2 %
BH CV ECHO MEAS - IVS/LVPW: 1.2 CM
BH CV ECHO MEAS - IVSD: 1.2 CM
BH CV ECHO MEAS - LA DIMENSION: 3.6 CM
BH CV ECHO MEAS - LV MASS(C)D: 140.1 GRAMS
BH CV ECHO MEAS - LVIDD: 3.9 CM
BH CV ECHO MEAS - LVIDS: 2.8 CM
BH CV ECHO MEAS - LVPWD: 1 CM
BH CV ECHO MEAS - SV(MOD-SP2): 29.3 ML
BH CV ECHO MEAS - SV(MOD-SP4): 50.1 ML
BUN SERPL-MCNC: 23 MG/DL (ref 8–23)
BUN/CREAT SERPL: 17.6 (ref 7–25)
CALCIUM SPEC-SCNC: 8.7 MG/DL (ref 8.6–10.5)
CHLORIDE SERPL-SCNC: 109 MMOL/L (ref 98–107)
CO2 SERPL-SCNC: 20.9 MMOL/L (ref 22–29)
CREAT SERPL-MCNC: 1.31 MG/DL (ref 0.76–1.27)
DEPRECATED RDW RBC AUTO: 46.5 FL (ref 37–54)
EGFRCR SERPLBLD CKD-EPI 2021: 55 ML/MIN/1.73
EOSINOPHIL # BLD AUTO: 0.63 10*3/MM3 (ref 0–0.4)
EOSINOPHIL NFR BLD AUTO: 5.3 % (ref 0.3–6.2)
ERYTHROCYTE [DISTWIDTH] IN BLOOD BY AUTOMATED COUNT: 13.2 % (ref 12.3–15.4)
GLUCOSE SERPL-MCNC: 101 MG/DL (ref 65–99)
HCT VFR BLD AUTO: 42.8 % (ref 37.5–51)
HGB BLD-MCNC: 13.9 G/DL (ref 13–17.7)
IMM GRANULOCYTES # BLD AUTO: 0.05 10*3/MM3 (ref 0–0.05)
IMM GRANULOCYTES NFR BLD AUTO: 0.4 % (ref 0–0.5)
INR PPP: 1.07 (ref 0.86–1.15)
LYMPHOCYTES # BLD AUTO: 2.31 10*3/MM3 (ref 0.7–3.1)
LYMPHOCYTES NFR BLD AUTO: 19.3 % (ref 19.6–45.3)
MCH RBC QN AUTO: 31 PG (ref 26.6–33)
MCHC RBC AUTO-ENTMCNC: 32.5 G/DL (ref 31.5–35.7)
MCV RBC AUTO: 95.5 FL (ref 79–97)
MONOCYTES # BLD AUTO: 1.08 10*3/MM3 (ref 0.1–0.9)
MONOCYTES NFR BLD AUTO: 9 % (ref 5–12)
NEUTROPHILS NFR BLD AUTO: 65.4 % (ref 42.7–76)
NEUTROPHILS NFR BLD AUTO: 7.84 10*3/MM3 (ref 1.7–7)
NRBC BLD AUTO-RTO: 0 /100 WBC (ref 0–0.2)
PLATELET # BLD AUTO: 252 10*3/MM3 (ref 140–450)
PMV BLD AUTO: 11.4 FL (ref 6–12)
POTASSIUM SERPL-SCNC: 4.9 MMOL/L (ref 3.5–5.2)
PROTHROMBIN TIME: 14.1 SECONDS (ref 11.8–14.9)
RBC # BLD AUTO: 4.48 10*6/MM3 (ref 4.14–5.8)
SODIUM SERPL-SCNC: 140 MMOL/L (ref 136–145)
WBC NRBC COR # BLD AUTO: 11.98 10*3/MM3 (ref 3.4–10.8)

## 2024-06-18 PROCEDURE — 25010000002 BUPIVACAINE (PF) 0.5 % SOLUTION: Performed by: INTERNAL MEDICINE

## 2024-06-18 PROCEDURE — 33228 REMV&REPLC PM GEN DUAL LEAD: CPT | Performed by: INTERNAL MEDICINE

## 2024-06-18 PROCEDURE — 80048 BASIC METABOLIC PNL TOTAL CA: CPT | Performed by: NURSE PRACTITIONER

## 2024-06-18 PROCEDURE — 25010000002 FENTANYL CITRATE (PF) 50 MCG/ML SOLUTION: Performed by: INTERNAL MEDICINE

## 2024-06-18 PROCEDURE — 25010000002 CEFAZOLIN PER 500 MG: Performed by: INTERNAL MEDICINE

## 2024-06-18 PROCEDURE — 85025 COMPLETE CBC W/AUTO DIFF WBC: CPT | Performed by: NURSE PRACTITIONER

## 2024-06-18 PROCEDURE — 85610 PROTHROMBIN TIME: CPT | Performed by: NURSE PRACTITIONER

## 2024-06-18 PROCEDURE — 93308 TTE F-UP OR LMTD: CPT | Performed by: INTERNAL MEDICINE

## 2024-06-18 PROCEDURE — C1785 PMKR, DUAL, RATE-RESP: HCPCS | Performed by: INTERNAL MEDICINE

## 2024-06-18 PROCEDURE — 93308 TTE F-UP OR LMTD: CPT

## 2024-06-18 PROCEDURE — 25010000002 MIDAZOLAM PER 1MG: Performed by: INTERNAL MEDICINE

## 2024-06-18 DEVICE — GEN PM AZURE XT SURESCAN DR MRI: Type: IMPLANTABLE DEVICE | Site: CHEST | Status: FUNCTIONAL

## 2024-06-18 RX ORDER — SODIUM CHLORIDE 9 MG/ML
40 INJECTION, SOLUTION INTRAVENOUS AS NEEDED
Status: DISCONTINUED | OUTPATIENT
Start: 2024-06-18 | End: 2024-06-18 | Stop reason: HOSPADM

## 2024-06-18 RX ORDER — SODIUM CHLORIDE 0.9 % (FLUSH) 0.9 %
10 SYRINGE (ML) INJECTION EVERY 12 HOURS SCHEDULED
Status: DISCONTINUED | OUTPATIENT
Start: 2024-06-18 | End: 2024-06-18 | Stop reason: HOSPADM

## 2024-06-18 RX ORDER — BUPIVACAINE HYDROCHLORIDE 5 MG/ML
INJECTION, SOLUTION EPIDURAL; INTRACAUDAL
Status: DISCONTINUED | OUTPATIENT
Start: 2024-06-18 | End: 2024-06-18 | Stop reason: HOSPADM

## 2024-06-18 RX ORDER — SODIUM CHLORIDE 0.9 % (FLUSH) 0.9 %
3 SYRINGE (ML) INJECTION EVERY 12 HOURS SCHEDULED
Status: DISCONTINUED | OUTPATIENT
Start: 2024-06-18 | End: 2024-06-18 | Stop reason: HOSPADM

## 2024-06-18 RX ORDER — LIDOCAINE HYDROCHLORIDE 20 MG/ML
INJECTION, SOLUTION INFILTRATION; PERINEURAL
Status: DISCONTINUED | OUTPATIENT
Start: 2024-06-18 | End: 2024-06-18 | Stop reason: HOSPADM

## 2024-06-18 RX ORDER — SODIUM CHLORIDE 0.9 % (FLUSH) 0.9 %
10 SYRINGE (ML) INJECTION AS NEEDED
Status: DISCONTINUED | OUTPATIENT
Start: 2024-06-18 | End: 2024-06-18 | Stop reason: HOSPADM

## 2024-06-18 RX ORDER — FENTANYL CITRATE 50 UG/ML
INJECTION, SOLUTION INTRAMUSCULAR; INTRAVENOUS
Status: DISCONTINUED | OUTPATIENT
Start: 2024-06-18 | End: 2024-06-18 | Stop reason: HOSPADM

## 2024-06-18 RX ORDER — MIDAZOLAM HYDROCHLORIDE 2 MG/2ML
INJECTION, SOLUTION INTRAMUSCULAR; INTRAVENOUS
Status: DISCONTINUED | OUTPATIENT
Start: 2024-06-18 | End: 2024-06-18 | Stop reason: HOSPADM

## 2024-06-18 RX ORDER — CEPHALEXIN 500 MG/1
500 CAPSULE ORAL 3 TIMES DAILY
Qty: 12 CAPSULE | Refills: 0 | Status: SHIPPED | OUTPATIENT
Start: 2024-06-18

## 2024-06-18 RX ORDER — ONDANSETRON 2 MG/ML
4 INJECTION INTRAMUSCULAR; INTRAVENOUS EVERY 6 HOURS PRN
Status: DISCONTINUED | OUTPATIENT
Start: 2024-06-18 | End: 2024-06-18 | Stop reason: HOSPADM

## 2024-06-18 RX ORDER — ACETAMINOPHEN 650 MG/1
650 SUPPOSITORY RECTAL EVERY 4 HOURS PRN
Status: DISCONTINUED | OUTPATIENT
Start: 2024-06-18 | End: 2024-06-18 | Stop reason: HOSPADM

## 2024-06-18 RX ORDER — ACETAMINOPHEN 325 MG/1
650 TABLET ORAL EVERY 4 HOURS PRN
Status: DISCONTINUED | OUTPATIENT
Start: 2024-06-18 | End: 2024-06-18 | Stop reason: HOSPADM

## 2024-06-18 RX ADMIN — SODIUM CHLORIDE 2 G: 9 INJECTION, SOLUTION INTRAVENOUS at 11:47

## 2024-06-18 NOTE — Clinical Note
Allergies reviewed.  H&P note has been confirmed for the patient. Procedural consent has been signed.  Staff has reviewed the patient's labs. left upper arm

## 2024-06-19 ENCOUNTER — TELEPHONE (OUTPATIENT)
Dept: CARDIOLOGY | Facility: CLINIC | Age: 79
End: 2024-06-19
Payer: MEDICARE

## 2024-06-19 NOTE — TELEPHONE ENCOUNTER
----- Message from Janessa Shola sent at 6/19/2024  3:49 PM EDT -----  Echocardiogram shows normal heart muscle function with mild calcification of the aortic valve.  Aortic valve is opening and closing properly.  Follow-up as scheduled

## 2024-06-25 ENCOUNTER — CLINICAL SUPPORT NO REQUIREMENTS (OUTPATIENT)
Dept: CARDIOLOGY | Facility: CLINIC | Age: 79
End: 2024-06-25
Payer: MEDICARE

## 2024-06-25 DIAGNOSIS — Z95.0 PRESENCE OF CARDIAC PACEMAKER: Primary | ICD-10-CM

## 2024-06-25 DIAGNOSIS — Z95.0 CARDIAC PACEMAKER: ICD-10-CM

## 2024-12-16 PROBLEM — Z45.010 PACEMAKER AT END OF BATTERY LIFE: Status: RESOLVED | Noted: 2024-05-02 | Resolved: 2024-12-16

## 2025-04-10 ENCOUNTER — OFFICE VISIT (OUTPATIENT)
Dept: CARDIOLOGY | Facility: CLINIC | Age: 80
End: 2025-04-10
Payer: MEDICARE

## 2025-04-10 VITALS
HEART RATE: 66 BPM | DIASTOLIC BLOOD PRESSURE: 98 MMHG | HEIGHT: 70 IN | SYSTOLIC BLOOD PRESSURE: 150 MMHG | BODY MASS INDEX: 21.85 KG/M2 | WEIGHT: 152.6 LBS

## 2025-04-10 DIAGNOSIS — Z95.0 PRESENCE OF CARDIAC PACEMAKER: Primary | ICD-10-CM

## 2025-04-10 DIAGNOSIS — I10 ESSENTIAL HYPERTENSION: ICD-10-CM

## 2025-04-10 RX ORDER — SODIUM BICARBONATE 650 MG/1
1300 TABLET ORAL 2 TIMES DAILY
COMMUNITY
Start: 2025-03-06

## 2025-04-10 RX ORDER — LOSARTAN POTASSIUM 25 MG/1
25 TABLET ORAL DAILY
Qty: 30 TABLET | Refills: 0 | Status: SHIPPED | OUTPATIENT
Start: 2025-04-10

## 2025-04-10 NOTE — PROGRESS NOTES
Chief Complaint  Follow-up and Hypertension    Subjective            History of Present Illness  Dedrick Cho is a 79-year-old male patient who presents to the office today for follow up.    History of Present Illness  The patient presents for pacemaker monitoring and blood pressure management.    He is currently on a regimen of carvedilol 6.25 mg administered twice daily. His losartan medication was discontinued by Dr. Martin's associate due to significant weight loss and hypotension, which was attributed to depressive symptoms following the passing of his wife in 2020. He has been monitoring his blood pressure at home and believes that resuming losartan would be beneficial.    He reports experiencing a burning sensation around the site of his pacemaker, which he attributes to skin stretching due to weight loss. This symptom has since resolved. He also notes that his pacemaker is more protrusive due to the weight loss.    MEDICATIONS  Current: Carvedilol.  Discontinued: Losartan.        PMH  Past Medical History:   Diagnosis Date    CHB with dual chamber pacemaker 2021    follows with Dr. Myers    Disease of thyroid gland     Essential hypertension 2021    History of TIA (transient ischemic attack) 2021    Hyperlipidemia     Seasonal allergies          ALLERGY  No Known Allergies       SURGICALHX  Past Surgical History:   Procedure Laterality Date    COLONOSCOPY      CYST REMOVAL      INSERT / REPLACE / REMOVE PACEMAKER      PACEMAKER REPLACEMENT N/A 2024    Procedure: PPM generator change - dual;  Surgeon: Arnel Myers MD;  Location: [facility] INVASIVE LOCATION;  Service: Cardiovascular;  Laterality: N/A;          SOC  Social History     Socioeconomic History    Marital status:    Tobacco Use    Smoking status: Former     Current packs/day: 0.00     Types: Cigarettes     Quit date: 1968     Years since quittin.3    Smokeless tobacco: Never   Vaping Use    Vaping status:  "Never Used   Substance and Sexual Activity    Alcohol use: Not Currently    Drug use: Never    Sexual activity: Defer         FAMHX  Family History   Problem Relation Age of Onset    Cancer Father           MEDSIGONLY  Current Outpatient Medications on File Prior to Visit   Medication Sig    atorvastatin (LIPITOR) 20 MG tablet Take 1 tablet by mouth Every Night.    carvedilol (COREG) 6.25 MG tablet 1 tablet 2 (Two) Times a Day.    clopidogrel (PLAVIX) 75 MG tablet 1 tablet Daily.    Coenzyme Q10 60 MG capsule Take  by mouth.    ferrous sulfate 324 (65 Fe) MG tablet delayed-release EC tablet Take 1 tablet by mouth Daily With Breakfast.    folic acid (FOLVITE) 400 MCG tablet Take 2 tablets by mouth Daily.    levothyroxine (SYNTHROID, LEVOTHROID) 150 MCG tablet Take 1 tablet by mouth Daily.    sodium bicarbonate 650 MG tablet Take 2 tablets by mouth 2 (Two) Times a Day.    vitamin C (ASCORBIC ACID) 250 MG tablet Take 1 tablet by mouth Daily.    cetirizine (zyrTEC) 10 MG tablet Daily As Needed. (Patient not taking: Reported on 4/10/2025)    [DISCONTINUED] cephalexin (Keflex) 500 MG capsule Take 1 capsule by mouth 3 (Three) Times a Day.    [DISCONTINUED] losartan (COZAAR) 50 MG tablet Take 1 tablet by mouth Daily.     No current facility-administered medications on file prior to visit.         Objective   /98   Pulse 66   Ht 177.8 cm (70\")   Wt 69.2 kg (152 lb 9.6 oz)   BMI 21.90 kg/m²       Physical Exam  Constitutional:       Appearance: He is normal weight.   HENT:      Head: Normocephalic.   Neck:      Vascular: No carotid bruit.   Cardiovascular:      Rate and Rhythm: Normal rate and regular rhythm.      Pulses: Normal pulses.      Heart sounds: Normal heart sounds. No murmur heard.  Pulmonary:      Effort: Pulmonary effort is normal.      Breath sounds: Normal breath sounds.   Musculoskeletal:      Cervical back: Neck supple.      Right lower leg: No edema.      Left lower leg: No edema.   Skin:     " "General: Skin is dry.   Neurological:      Mental Status: He is alert and oriented to person, place, and time.   Psychiatric:         Behavior: Behavior normal.       Result Review :   The following data was reviewed by: DIANE Solorio on 04/10/2025:  No results found for: \"PROBNP\"  CMP          6/18/2024    07:00   CMP   Glucose 101    BUN 23    Creatinine 1.31    EGFR 55.0    Sodium 140    Potassium 4.9    Chloride 109    Calcium 8.7    BUN/Creatinine Ratio 17.6    Anion Gap 10.1      CBC w/diff          6/18/2024    07:00   CBC w/Diff   WBC 11.98    RBC 4.48    Hemoglobin 13.9    Hematocrit 42.8    MCV 95.5    MCH 31.0    MCHC 32.5    RDW 13.2    Platelets 252    Neutrophil Rel % 65.4    Immature Granulocyte Rel % 0.4    Lymphocyte Rel % 19.3    Monocyte Rel % 9.0    Eosinophil Rel % 5.3    Basophil Rel % 0.6       No results found for: \"TSH\"   No results found for: \"FREET4\"   No results found for: \"DDIMERQUANT\"  No results found for: \"MG\"   No results found for: \"DIGOXIN\"   No results found for: \"TROPONINT\"        Results for orders placed during the hospital encounter of 06/18/24    Adult Transthoracic Echo Limited W/ Cont if Necessary Per Protocol    Interpretation Summary    Left ventricular systolic function is normal. Calculated left ventricular EF = 55.9%    Left ventricular wall thickness is consistent with mild concentric hypertrophy.    There is mild calcification of the aortic valve.         Assessment and Plan    Diagnoses and all orders for this visit:    1. CHB with dual chamber pacemaker (Primary)    2. Essential hypertension      Assessment & Plan  1. Hypertension.  His blood pressure readings are slightly elevated during this visit. A prescription for losartan 25 mg will be issued, with instructions to monitor his blood pressure and maintain a log for a duration of 2 weeks. Upon receipt of the blood pressure log, the recorded readings will be reviewed to ascertain the adequacy of the " current dosage. If necessary, adjustments to the medication regimen will be discussed over the phone.    2. Pacemaker monitoring.  The most recent report from his home device, dated 03/06/2025, indicates no new cardiac episodes. The battery life of the device is estimated to last for an additional 10-1/2 years. A formal device interrogation will be conducted during his follow-up visit with Dr. Myers in 6 months.      Follow Up   Return in about 6 months (around 10/10/2025) for Follow up with Dr Myers with device check.    Patient was given instructions and counseling regarding his condition or for health maintenance advice. Please see specific information pulled into the AVS if appropriate.     Dedrick Cho  reports that he quit smoking about 57 years ago. His smoking use included cigarettes. He has never used smokeless tobacco.            Patient or patient representative verbalized consent for the use of Ambient Listening during the visit with  DIANE Solorio for chart documentation. 4/11/2025  10:43 EDT    DIANE Solorio  04/10/25  09:24 EDT    Dictated Utilizing Dragon Dictation

## 2025-04-29 ENCOUNTER — TELEPHONE (OUTPATIENT)
Dept: CARDIOLOGY | Facility: CLINIC | Age: 80
End: 2025-04-29
Payer: MEDICARE

## 2025-04-29 RX ORDER — LOSARTAN POTASSIUM 25 MG/1
25 TABLET ORAL DAILY
Qty: 90 TABLET | Refills: 3 | Status: SHIPPED | OUTPATIENT
Start: 2025-04-29

## 2025-04-29 NOTE — TELEPHONE ENCOUNTER
----- Message from Janessa Jolley sent at 4/29/2025  8:57 AM EDT -----  BP log looks great, continue current dose of losartan 25 mg daily  ----- Message -----  From: Melany Rollins RegSched Rep  Sent: 4/29/2025   8:39 AM EDT  To: DIANE Byers

## 2025-04-29 NOTE — TELEPHONE ENCOUNTER
Rx Refill Note  Requested Prescriptions     Pending Prescriptions Disp Refills    losartan (COZAAR) 25 MG tablet [Pharmacy Med Name: Losartan Potassium 25 MG Oral Tablet] 30 tablet 0     Sig: Take 1 tablet by mouth once daily        LAST OFFICE VISIT:  4/10/2025     NEXT OFFICE VISIT:  10/27/2025     Does the medication requests match the last office note:    [x] Yes   [] No    Does this refill request meet protocol details for MA to approve:     [x] Yes   [] No   [] No Protocols Provided

## 2025-07-09 LAB
MC_CV_MDC_IDC_RATE_1: 150
MC_CV_MDC_IDC_RATE_1: 171
MC_CV_MDC_IDC_THERAPIES: NORMAL
MC_CV_MDC_IDC_ZONE_ID: 2
MC_CV_MDC_IDC_ZONE_ID: 6
MDC_IDC_MSMT_BATTERY_REMAINING_LONGEVITY: 125 MO
MDC_IDC_MSMT_BATTERY_RRT_TRIGGER: 2.62
MDC_IDC_MSMT_BATTERY_STATUS: NORMAL
MDC_IDC_MSMT_BATTERY_VOLTAGE: 3.02
MDC_IDC_MSMT_LEADCHNL_RA_DTM: NORMAL
MDC_IDC_MSMT_LEADCHNL_RA_IMPEDANCE_VALUE: 342
MDC_IDC_MSMT_LEADCHNL_RA_PACING_THRESHOLD_AMPLITUDE: 0.5
MDC_IDC_MSMT_LEADCHNL_RA_PACING_THRESHOLD_POLARITY: NORMAL
MDC_IDC_MSMT_LEADCHNL_RA_PACING_THRESHOLD_PULSEWIDTH: 0.4
MDC_IDC_MSMT_LEADCHNL_RA_SENSING_INTR_AMPL: 1.25
MDC_IDC_MSMT_LEADCHNL_RV_DTM: NORMAL
MDC_IDC_MSMT_LEADCHNL_RV_IMPEDANCE_VALUE: 380
MDC_IDC_MSMT_LEADCHNL_RV_PACING_THRESHOLD_AMPLITUDE: 0.75
MDC_IDC_MSMT_LEADCHNL_RV_PACING_THRESHOLD_POLARITY: NORMAL
MDC_IDC_MSMT_LEADCHNL_RV_PACING_THRESHOLD_PULSEWIDTH: 0.4
MDC_IDC_MSMT_LEADCHNL_RV_SENSING_INTR_AMPL: 10.62
MDC_IDC_PG_IMPLANT_DTM: NORMAL
MDC_IDC_PG_MFG: NORMAL
MDC_IDC_PG_MODEL: NORMAL
MDC_IDC_PG_SERIAL: NORMAL
MDC_IDC_PG_TYPE: NORMAL
MDC_IDC_SESS_DTM: NORMAL
MDC_IDC_SESS_TYPE: NORMAL
MDC_IDC_SET_BRADY_AT_MODE_SWITCH_RATE: 171
MDC_IDC_SET_BRADY_LOWRATE: 60
MDC_IDC_SET_BRADY_MAX_SENSOR_RATE: 130
MDC_IDC_SET_BRADY_MAX_TRACKING_RATE: 130
MDC_IDC_SET_BRADY_MODE: NORMAL
MDC_IDC_SET_BRADY_PAV_DELAY: 180
MDC_IDC_SET_BRADY_SAV_DELAY: 150
MDC_IDC_SET_LEADCHNL_RA_PACING_AMPLITUDE: 1.5
MDC_IDC_SET_LEADCHNL_RA_PACING_POLARITY: NORMAL
MDC_IDC_SET_LEADCHNL_RA_PACING_PULSEWIDTH: 0.4
MDC_IDC_SET_LEADCHNL_RA_SENSING_POLARITY: NORMAL
MDC_IDC_SET_LEADCHNL_RA_SENSING_SENSITIVITY: 0.3
MDC_IDC_SET_LEADCHNL_RV_PACING_AMPLITUDE: 2
MDC_IDC_SET_LEADCHNL_RV_PACING_POLARITY: NORMAL
MDC_IDC_SET_LEADCHNL_RV_PACING_PULSEWIDTH: 0.4
MDC_IDC_SET_LEADCHNL_RV_SENSING_POLARITY: NORMAL
MDC_IDC_SET_LEADCHNL_RV_SENSING_SENSITIVITY: 0.9
MDC_IDC_SET_ZONE_STATUS: NORMAL
MDC_IDC_SET_ZONE_STATUS: NORMAL
MDC_IDC_SET_ZONE_TYPE: NORMAL
MDC_IDC_SET_ZONE_TYPE: NORMAL
MDC_IDC_STAT_AT_BURDEN_PERCENT: 0
MDC_IDC_STAT_BRADY_RA_PERCENT_PACED: 64.06
MDC_IDC_STAT_BRADY_RV_PERCENT_PACED: 98.33

## (undated) DEVICE — UNDYED BRAIDED (POLYGLACTIN 910), SYNTHETIC ABSORBABLE SUTURE: Brand: COATED VICRYL

## (undated) DEVICE — ANTIBACTERIAL UNDYED BRAIDED (POLYGLACTIN 910), SYNTHETIC ABSORBABLE SUTURE: Brand: COATED VICRYL

## (undated) DEVICE — 3M™ STERI-STRIP™ REINFORCED ADHESIVE SKIN CLOSURES, R1546, 1/4 IN X 4 IN (6 MM X 100 MM), 10 STRIPS/ENVELOPE: Brand: 3M™ STERI-STRIP™

## (undated) DEVICE — PLASMABLADE PS200-040 4.0: Brand: PLASMABLADE™

## (undated) DEVICE — DRSNG SURG AQUACEL AG/ADVNTGE 9X15CM 3.5X6IN